# Patient Record
Sex: FEMALE | Race: WHITE | Employment: FULL TIME | URBAN - METROPOLITAN AREA
[De-identification: names, ages, dates, MRNs, and addresses within clinical notes are randomized per-mention and may not be internally consistent; named-entity substitution may affect disease eponyms.]

---

## 2022-10-13 ENCOUNTER — TELEPHONE (OUTPATIENT)
Dept: OBGYN CLINIC | Age: 22
End: 2022-10-13

## 2022-10-13 NOTE — TELEPHONE ENCOUNTER
Pt called to schedule ED follow up. Pt was seen at [facility] ED on 10/12/22 for vaginal bleeding. Pt is 8w4d by LMP of 8/14/22. Blood type: O+. HCG Qt 7085. Pelvic ultrasound shows:    \"Impression:     1. Single viable intrauterine gestation with gestational age based on ultrasound is 6 weeks 6 days +/- 3 days with MEAGAN based on ultrasound of 6/1/2023. There is possibly a small amount of perigestational hemorrhage. The cervix appears closed. 2. No evidence of ovarian torsion. 3. Probable right ovarian corpus cyst.\"    Pt reports heavy vaginal bleeding \"like a period that comes in flows. \" Pt denies any pelvic pain. Pt states her bleeding is about the same as it was when she went to the ED. Pt is scheduled for GYNUS Preg Conf on 10/20/22 with .  is currently not in the office today, will review above information with MD in office. Precautions reviewed. Pt voiced understanding.

## 2022-10-14 NOTE — TELEPHONE ENCOUNTER
Pt called to schedule ER follow up. Pt reports on and off vaginal bleeding. Pt is currently scheduled with  on 10/20/22 but wants to be seen sooner. Reviewed with . Per Lorrie Councilman pt needs to be seen 10/17 or 10/18 and to give strict bleeding precautions. Precautions reviewed. Pt voiced understanding. GYNUS scheduled for 10/17/22 with .

## 2022-10-17 ENCOUNTER — OFFICE VISIT (OUTPATIENT)
Dept: OBGYN CLINIC | Age: 22
End: 2022-10-17
Payer: COMMERCIAL

## 2022-10-17 VITALS
HEIGHT: 62 IN | DIASTOLIC BLOOD PRESSURE: 82 MMHG | SYSTOLIC BLOOD PRESSURE: 114 MMHG | BODY MASS INDEX: 27.05 KG/M2 | WEIGHT: 147 LBS

## 2022-10-17 DIAGNOSIS — N93.9 VAGINAL BLEEDING: ICD-10-CM

## 2022-10-17 DIAGNOSIS — Z31.69 ENCOUNTER FOR PRECONCEPTION CONSULTATION: ICD-10-CM

## 2022-10-17 DIAGNOSIS — O03.9 MISCARRIAGE: Primary | ICD-10-CM

## 2022-10-17 PROBLEM — Z96.22 HISTORY OF PLACEMENT OF EAR TUBES: Status: ACTIVE | Noted: 2022-10-17

## 2022-10-17 PROBLEM — F41.9 ANXIETY: Status: ACTIVE | Noted: 2021-11-30

## 2022-10-17 PROCEDURE — 76830 TRANSVAGINAL US NON-OB: CPT | Performed by: OBSTETRICS & GYNECOLOGY

## 2022-10-17 PROCEDURE — 99203 OFFICE O/P NEW LOW 30 MIN: CPT | Performed by: OBSTETRICS & GYNECOLOGY

## 2022-10-17 RX ORDER — ESCITALOPRAM OXALATE 20 MG/1
20 TABLET ORAL DAILY
COMMUNITY
Start: 2022-09-06

## 2022-10-17 NOTE — PROGRESS NOTES
10/17/2022      Lenora Collazo  : 2000  22 y. o. No obstetric history on file. HPI: US and visit with me for ER f/up. SpAb. Went for heavy bldg 5d ago. US showed IUP, viable. Measured. 6w6d. Had severe cramps last pm. Has passage of the gestational sac last pm. Went to [place] er last night. Hgb was 14.5. Cramping has now resolved and bldg has slowed a lot. O+  1st pregnancy. On [employer] pnv. Pt was born PT. Her brother had club foot. No fam hx autism, MR, genetic d/o's. Pt did not have clinical chicken pox. Did get her childhood vaccines. Here with her . They plan to conceive again soon. Exam:  /82   Ht 5' 2\" (1.575 m)   Wt 147 lb (66.7 kg)   LMP 2022   BMI 26.89 kg/m²     Body mass index is 26.89 kg/m². Pt in no distress. Alert and oriented x3. Affect bright. Well developed, well nourished. HEENT: normocephalic, atraumatic. Sclerae nonicteric. Neuro: grossly intact    US shows:  Uterus empty now. Stripe thick at 15mm and heterogeneous, but avascular. Ovaries NL. Images reviewed. Mick Ledbetter was seen today for follow-up, vaginal bleeding and amenorrhea. Diagnoses and all orders for this visit:    Miscarriage  -     HCG, Quantitative, Pregnancy; Future    Vaginal bleeding  -     AMB POC US, TRANSVAGINAL    Encounter for preconception consultation     Gave info on carrier screening. Offered hiv, rubella and VZ igg. Plan quant next wk and follow to zero. Pt sts she is up to date on preventive gyn care. Continue her PNV. Rec use condoms until she has a spontaneous period. D/w couple that this was a very early miscarriage, probably genetic etiology. Does not increase their risk over baseline for future miscarriages.      Bandar Romero MD, MD

## 2022-10-21 ENCOUNTER — TELEPHONE (OUTPATIENT)
Dept: OBGYN CLINIC | Age: 22
End: 2022-10-21

## 2022-10-21 NOTE — TELEPHONE ENCOUNTER
Pt went to [facility] ED on 10/20/22 for MAB. Pt was seen by  on 10/17/22 for MAB, follow up plan was discussed-pt to repeat hcg until negative. Attempted to contact pt. No answer, LM instructing pt to return call.